# Patient Record
(demographics unavailable — no encounter records)

---

## 2025-03-31 NOTE — ADDENDUM
[FreeTextEntry1] : Documented by Debby Dial acting as a scribe for Dr. Frederick on 03/28/2025. All medical record entries made by the Scribe were at Dr. Frederick's direction and personally dictated by me on 03/28/2025. I have reviewed the chart and agree that the record accurately reflects my personal performances of the history, physical exam, assessment, and plan. I have also personally directed, reviewed, and agree with the plan.
[FreeTextEntry1] : Documented by Debby iDal acting as a scribe for Dr. Frederick on 03/28/2025. All medical record entries made by the Scribe were at Dr. Frederick's direction and personally dictated by me on 03/28/2025. I have reviewed the chart and agree that the record accurately reflects my personal performances of the history, physical exam, assessment, and plan. I have also personally directed, reviewed, and agree with the plan.
Home

## 2025-03-31 NOTE — ASSESSMENT
[FreeTextEntry1] : Palmira is a 7-year-old girl here today to discuss removing and replacing her Supprelin implant, which was placed in her left arm on 04/25/2024. She has been doing very well otherwise and remains asymptomatic at this time. I discussed the procedure of removal and replacement in detail with the family. I reviewed the indications, risks, and possible complications including the possibility of bleeding or infection. I have also explained to them that this procedure is similar to when the implant was originally in placed. I discussed the need and use of a short general anesthesia and what that entails. I have also emphasized to the family that Palmira needs to follow up with endocrinology before the procedure as well. After our discussion, the family indicated their understanding and agreed to proceed with the procedure. We will schedule this electively. They have my information and know to contact me sooner with any questions or concerns.

## 2025-03-31 NOTE — REASON FOR VISIT
[Follow-up - Scheduled] : a follow-up, scheduled visit for [Supprelin management] : supprelin management [Parents] : parents [FreeTextEntry4] : Jose Treadwell MD [Interpreters_IDNumber] : [id number] [TWNoteComboBox1] : Chinese

## 2025-03-31 NOTE — CONSULT LETTER
[Dear  ___] : Dear  [unfilled], [Consult Letter:] : I had the pleasure of evaluating your patient, [unfilled]. [Please see my note below.] : Please see my note below. [Consult Closing:] : Thank you very much for allowing me to participate in the care of this patient.  If you have any questions, please do not hesitate to contact me. [Sincerely,] : Sincerely, [Courtesy Letter:] : I had the pleasure of seeing your patient, [unfilled], in my office today. [DrSofie  ___] : Dr. SOLITARIO [FreeTextEntry2] : Jose Treadwell MD [FreeTextEntry3] : Kole Frederick MD Associate Professor of Surgery and Pediatrics [facility] [employer] at [facility] Pediatric [facility] [phone]

## 2025-03-31 NOTE — CONSULT LETTER
[Dear  ___] : Dear  [unfilled], [Consult Letter:] : I had the pleasure of evaluating your patient, [unfilled]. [Please see my note below.] : Please see my note below. [Consult Closing:] : Thank you very much for allowing me to participate in the care of this patient.  If you have any questions, please do not hesitate to contact me. [Sincerely,] : Sincerely, [Courtesy Letter:] : I had the pleasure of seeing your patient, [unfilled], in my office today. [DrSofie  ___] : Dr. SOLITARIO [FreeTextEntry2] : Jose Treadwell MD [FreeTextEntry3] : Kole Frederick MD Associate Professor of Surgery and Pediatrics [facility] [employer] at [facility] Pediatric Surgery [facility] [phone]

## 2025-03-31 NOTE — REASON FOR VISIT
[Follow-up - Scheduled] : a follow-up, scheduled visit for [Supprelin management] : supprelin management [Parents] : parents [FreeTextEntry4] : Jose Treadwell MD [Interpreters_IDNumber] : [id number] [TWNoteComboBox1] : Chinese

## 2025-03-31 NOTE — PHYSICAL EXAM
[NL] : grossly intact [Acute Distress] : no acute distress [TextBox_73] : left arm with well healed scar and palpable implant

## 2025-03-31 NOTE — HISTORY OF PRESENT ILLNESS
[FreeTextEntry1] : Palmira is a 7-year-old girl that has a history of precocious puberty here today to discuss removing and replacing her Supprelin implant, which was placed in her left arm on 04/25/2024. She is currently doing well, with no unexplained pain or fevers. Her mother states that Palmira will be following up with endocrinology in a few weeks.

## 2025-04-02 NOTE — REASON FOR VISIT
[Follow-Up: _____] : a [unfilled] follow-up visit  [Patient] : patient [Mother] : mother [Father] : father [Patient Declined  Services] : - None: Patient declined  services

## 2025-04-11 NOTE — HISTORY OF PRESENT ILLNESS
[Premenarchal] : premenarchal [Headaches] : no headaches [Visual Symptoms] : no ~T visual symptoms [Polyuria] : no polyuria [Polydipsia] : no polydipsia [Knee Pain] : no knee pain [Hip Pain] : no hip pain [Constipation] : no constipation [Cold Intolerance] : no cold intolerance [Heat Intolerance] : no heat intolerance [Fatigue] : no fatigue [Abdominal Pain] : no abdominal pain [Vomiting] : no vomiting [FreeTextEntry2] :  Palmira is a 7-year 31-cvryc-pvy little girl who presents for follow-up of precocious puberty.  She was referred at 6 years 2 months in August 2022 in the setting of breast pain in the absence of pubic hair, underarm her body odor. Though on physical exam, no true glandular tissue was appreciated, lab evaluation was recommended.  Androgens including 17 OHP were within normal limits, ruling out nonclassical CAH. LH was consistent with early puberty at 0.56 IUs/mL bone age was read by me as about 6y3mo for radius/ulna and 7y 3mo for fingers - not significantly advanced. Rad read was 7y and 10mo. as such, MRI was recommended.  She was last seen on 1/2024 when parents had been unable to obtain the MRI and were concerned for progression of puberty. Mom notes progressive breast development bilaterally and denies any pubic hair. On review of growth charts, linear growth was accelerated mildly from the 18th percentile at prior visit to the 23rd percentile today. Exam was lynn 2 for breats bilaterally. LH continues to rise, consistent with puberty. Bone age advanced to 8 years 10 months at 6 years 8 months. MRI with no concern for underlying hypothalamic or pituitary mass. Pituitary mass is large for age, 8 mm in height which is likely consistent with physiologic increase in size with puberty. Supprelin implant recommended. Parents reiterate that there is no family history of early puberty.  Palmira has been well. Started supprelin implant ~ April 2024. She is scheduled for replacement in May 2025. Parents denied progression of breast development. Palmira no longer gets tenderness at the breasts. Denied presence of pubic hair, axillary hair, adult body odor, vaginal discharge, vaginal bleeding.  GV 6.56 cm// year annualized since last visit.  Mom's height 64 inches Dad's height 68 inches.

## 2025-04-11 NOTE — PHYSICAL EXAM
[Healthy Appearing] : healthy appearing [Well Nourished] : well nourished [Interactive] : interactive [Well formed] : well formed [Normally Set] : normally set [Normal S1 and S2] : normal S1 and S2 [Clear to Ausculation Bilaterally] : clear to auscultation bilaterally [Abdomen Soft] : soft [Abdomen Tenderness] : non-tender [] : no hepatosplenomegaly [Normal] : normal  [Normal Appearance] : normal in appearance [Benigno Stage ___] : the Benigno stage for breast development was [unfilled] [Murmur] : no murmurs [de-identified] : Benigno II breast development bilaterally [de-identified] : Fine hairs to mons pubis

## 2025-04-11 NOTE — ADDENDUM
[FreeTextEntry1] : Addendum 4/11/25: Bone age is 10 years at chronological age 7 year and 10 months, which is progressed compared to prior. Predicted height is 58.62 inches (4ft 10.62 inches). MPH is 63.5 inches. Estradiol is suppressed and LH is 0.72. Will continue supprelin and repeat bone age in 6-12 months. I discussed the results and plan with Palmira's dad.

## 2025-04-11 NOTE — ASSESSMENT
[FreeTextEntry1] :  Palmira is a 7-year 29-ugsgj-xwb little girl who presents for follow-up of precocious puberty. Height today is 123.3 cm with annualized growth velocity 6.56 cm/yr. Exam is remarkable for regression of glandular breast tissue. We will repeat LH, FSH, estradiol to assess whether there is appropriate suppression. We will also repeat a bone age as the most recent bone age was advanced. She will continue therapy with Supprelin implant and is scheduled for a replacement in may 2025. We reinforced the importance of regular follow ups to monitor pubertal progression and growth. She will return in 5 months.

## 2025-04-11 NOTE — HISTORY OF PRESENT ILLNESS
[Premenarchal] : premenarchal [Headaches] : no headaches [Visual Symptoms] : no ~T visual symptoms [Polyuria] : no polyuria [Polydipsia] : no polydipsia [Knee Pain] : no knee pain [Hip Pain] : no hip pain [Constipation] : no constipation [Cold Intolerance] : no cold intolerance [Heat Intolerance] : no heat intolerance [Fatigue] : no fatigue [Abdominal Pain] : no abdominal pain [Vomiting] : no vomiting [FreeTextEntry2] :  Palmira is a 7-year 11-eraxy-uwg little girl who presents for follow-up of precocious puberty.  She was referred at 6 years 2 months in August 2022 in the setting of breast pain in the absence of pubic hair, underarm her body odor. Though on physical exam, no true glandular tissue was appreciated, lab evaluation was recommended.  Androgens including 17 OHP were within normal limits, ruling out nonclassical CAH. LH was consistent with early puberty at 0.56 IUs/mL bone age was read by me as about 6y3mo for radius/ulna and 7y 3mo for fingers - not significantly advanced. Rad read was 7y and 10mo. as such, MRI was recommended.  She was last seen on 1/2024 when parents had been unable to obtain the MRI and were concerned for progression of puberty. Mom notes progressive breast development bilaterally and denies any pubic hair. On review of growth charts, linear growth was accelerated mildly from the 18th percentile at prior visit to the 23rd percentile today. Exam was lynn 2 for breats bilaterally. LH continues to rise, consistent with puberty. Bone age advanced to 8 years 10 months at 6 years 8 months. MRI with no concern for underlying hypothalamic or pituitary mass. Pituitary mass is large for age, 8 mm in height which is likely consistent with physiologic increase in size with puberty. Supprelin implant recommended. Parents reiterate that there is no family history of early puberty.  Palmira has been well. Started supprelin implant ~ April 2024. She is scheduled for replacement in May 2025. Parents denied progression of breast development. Palmira no longer gets tenderness at the breasts. Denied presence of pubic hair, axillary hair, adult body odor, vaginal discharge, vaginal bleeding.  GV 6.56 cm// year annualized since last visit.  Mom's height 64 inches Dad's height 68 inches.

## 2025-04-11 NOTE — PHYSICAL EXAM
[Healthy Appearing] : healthy appearing [Well Nourished] : well nourished [Interactive] : interactive [Well formed] : well formed [Normally Set] : normally set [Normal S1 and S2] : normal S1 and S2 [Clear to Ausculation Bilaterally] : clear to auscultation bilaterally [Abdomen Soft] : soft [Abdomen Tenderness] : non-tender [] : no hepatosplenomegaly [Normal] : normal  [Normal Appearance] : normal in appearance [Benigno Stage ___] : the Benigno stage for breast development was [unfilled] [Murmur] : no murmurs [de-identified] : Benigno II breast development bilaterally [de-identified] : Fine hairs to mons pubis

## 2025-04-11 NOTE — ASSESSMENT
[FreeTextEntry1] :  Palmira is a 7-year 03-mrsfo-afr little girl who presents for follow-up of precocious puberty. Height today is 123.3 cm with annualized growth velocity 6.56 cm/yr. Exam is remarkable for regression of glandular breast tissue. We will repeat LH, FSH, estradiol to assess whether there is appropriate suppression. We will also repeat a bone age as the most recent bone age was advanced. She will continue therapy with Supprelin implant and is scheduled for a replacement in may 2025. We reinforced the importance of regular follow ups to monitor pubertal progression and growth. She will return in 5 months.

## 2025-04-11 NOTE — CONSULT LETTER
[Dear  ___] : Dear  [unfilled], [Consult Letter:] : I had the pleasure of evaluating your patient, [unfilled]. [Please see my note below.] : Please see my note below. [Consult Closing:] : Thank you very much for allowing me to participate in the care of this patient.  If you have any questions, please do not hesitate to contact me. [Sincerely,] : Sincerely, [FreeTextEntry3] : Kristel Brenner MD  [facility] Physician [facility] Division of Pediatric Endocrinology P: [phone] F: [phone]

## 2025-04-11 NOTE — CONSULT LETTER
[Dear  ___] : Dear  [unfilled], [Consult Letter:] : I had the pleasure of evaluating your patient, [unfilled]. [Please see my note below.] : Please see my note below. [Consult Closing:] : Thank you very much for allowing me to participate in the care of this patient.  If you have any questions, please do not hesitate to contact me. [Sincerely,] : Sincerely, [FreeTextEntry3] : Kristel Brenner MD  [facility] Physician [facility] Division of Pediatric Endocrinology P: [phone] F: [phone]